# Patient Record
Sex: MALE | Race: WHITE | NOT HISPANIC OR LATINO
[De-identification: names, ages, dates, MRNs, and addresses within clinical notes are randomized per-mention and may not be internally consistent; named-entity substitution may affect disease eponyms.]

---

## 2018-06-26 ENCOUNTER — APPOINTMENT (OUTPATIENT)
Dept: CARDIOLOGY | Facility: CLINIC | Age: 83
End: 2018-06-26
Payer: COMMERCIAL

## 2018-06-26 ENCOUNTER — NON-APPOINTMENT (OUTPATIENT)
Age: 83
End: 2018-06-26

## 2018-06-26 VITALS
HEART RATE: 62 BPM | HEIGHT: 67 IN | WEIGHT: 172 LBS | DIASTOLIC BLOOD PRESSURE: 53 MMHG | SYSTOLIC BLOOD PRESSURE: 105 MMHG | OXYGEN SATURATION: 97 % | BODY MASS INDEX: 27 KG/M2

## 2018-06-26 PROCEDURE — 99215 OFFICE O/P EST HI 40 MIN: CPT | Mod: 25

## 2018-06-26 PROCEDURE — 93000 ELECTROCARDIOGRAM COMPLETE: CPT

## 2018-06-26 RX ORDER — MUPIROCIN 20 MG/G
2 OINTMENT TOPICAL
Qty: 22 | Refills: 0 | Status: DISCONTINUED | COMMUNITY
Start: 2018-04-23 | End: 2018-06-26

## 2018-06-26 RX ORDER — RANITIDINE 150 MG/1
150 TABLET ORAL
Qty: 30 | Refills: 0 | Status: DISCONTINUED | COMMUNITY
Start: 2018-04-11 | End: 2018-06-26

## 2018-06-26 RX ORDER — FLUOROMETHOLONE 1 MG/ML
0.1 SOLUTION/ DROPS OPHTHALMIC
Qty: 5 | Refills: 0 | Status: DISCONTINUED | COMMUNITY
Start: 2018-04-19 | End: 2018-06-26

## 2018-06-26 RX ORDER — TADALAFIL 5 MG/1
5 TABLET, FILM COATED ORAL
Qty: 30 | Refills: 0 | Status: DISCONTINUED | COMMUNITY
Start: 2018-03-12 | End: 2018-06-26

## 2018-06-26 RX ORDER — CIPROFLOXACIN 3 MG/ML
0.3 SOLUTION OPHTHALMIC
Qty: 5 | Refills: 0 | Status: DISCONTINUED | COMMUNITY
Start: 2018-04-19 | End: 2018-06-26

## 2018-06-26 RX ORDER — AMOXICILLIN AND CLAVULANATE POTASSIUM 875; 125 MG/1; MG/1
875-125 TABLET, COATED ORAL
Qty: 14 | Refills: 0 | Status: DISCONTINUED | COMMUNITY
Start: 2018-06-05 | End: 2018-06-26

## 2018-07-18 ENCOUNTER — APPOINTMENT (OUTPATIENT)
Dept: CARDIOLOGY | Facility: CLINIC | Age: 83
End: 2018-07-18
Payer: COMMERCIAL

## 2018-07-18 PROCEDURE — 93306 TTE W/DOPPLER COMPLETE: CPT

## 2018-07-24 ENCOUNTER — APPOINTMENT (OUTPATIENT)
Dept: CARDIOLOGY | Facility: CLINIC | Age: 83
End: 2018-07-24
Payer: COMMERCIAL

## 2018-07-24 PROCEDURE — A9500: CPT

## 2018-07-24 PROCEDURE — 78452 HT MUSCLE IMAGE SPECT MULT: CPT

## 2018-07-24 PROCEDURE — 93015 CV STRESS TEST SUPVJ I&R: CPT

## 2018-08-14 ENCOUNTER — APPOINTMENT (OUTPATIENT)
Dept: CARDIOLOGY | Facility: CLINIC | Age: 83
End: 2018-08-14
Payer: COMMERCIAL

## 2018-08-14 VITALS
HEIGHT: 67 IN | BODY MASS INDEX: 27 KG/M2 | SYSTOLIC BLOOD PRESSURE: 118 MMHG | WEIGHT: 172 LBS | HEART RATE: 72 BPM | DIASTOLIC BLOOD PRESSURE: 72 MMHG | OXYGEN SATURATION: 96 %

## 2018-08-14 PROCEDURE — 99214 OFFICE O/P EST MOD 30 MIN: CPT

## 2019-07-26 ENCOUNTER — APPOINTMENT (OUTPATIENT)
Dept: CARDIOLOGY | Facility: CLINIC | Age: 84
End: 2019-07-26
Payer: COMMERCIAL

## 2019-07-26 VITALS
RESPIRATION RATE: 14 BRPM | HEIGHT: 67 IN | OXYGEN SATURATION: 96 % | SYSTOLIC BLOOD PRESSURE: 132 MMHG | BODY MASS INDEX: 27.94 KG/M2 | WEIGHT: 178 LBS | DIASTOLIC BLOOD PRESSURE: 77 MMHG | HEART RATE: 67 BPM | TEMPERATURE: 98.3 F

## 2019-07-26 PROCEDURE — 99214 OFFICE O/P EST MOD 30 MIN: CPT | Mod: 25

## 2019-07-26 PROCEDURE — 93000 ELECTROCARDIOGRAM COMPLETE: CPT

## 2019-07-26 NOTE — PHYSICAL EXAM
[General Appearance - Well Developed] : well developed [Normal Appearance] : normal appearance [Well Groomed] : well groomed [General Appearance - Well Nourished] : well nourished [No Deformities] : no deformities [General Appearance - In No Acute Distress] : no acute distress [Normal Conjunctiva] : the conjunctiva exhibited no abnormalities [Eyelids - No Xanthelasma] : the eyelids demonstrated no xanthelasmas [Normal Oral Mucosa] : normal oral mucosa [No Oral Pallor] : no oral pallor [No Oral Cyanosis] : no oral cyanosis [Normal Jugular Venous A Waves Present] : normal jugular venous A waves present [Normal Jugular Venous V Waves Present] : normal jugular venous V waves present [No Jugular Venous Hope A Waves] : no jugular venous hope A waves [Respiration, Rhythm And Depth] : normal respiratory rhythm and effort [Exaggerated Use Of Accessory Muscles For Inspiration] : no accessory muscle use [Auscultation Breath Sounds / Voice Sounds] : lungs were clear to auscultation bilaterally [Heart Rate And Rhythm] : heart rate and rhythm were normal [Heart Sounds] : normal S1 and S2 [Murmurs] : no murmurs present [Abdomen Soft] : soft [Abdomen Tenderness] : non-tender [Abdomen Mass (___ Cm)] : no abdominal mass palpated [Abnormal Walk] : normal gait [Gait - Sufficient For Exercise Testing] : the gait was sufficient for exercise testing [Nail Clubbing] : no clubbing of the fingernails [Cyanosis, Localized] : no localized cyanosis [Petechial Hemorrhages (___cm)] : no petechial hemorrhages [Skin Color & Pigmentation] : normal skin color and pigmentation [] : no rash [No Venous Stasis] : no venous stasis [Skin Lesions] : no skin lesions [No Skin Ulcers] : no skin ulcer [No Xanthoma] : no  xanthoma was observed [Oriented To Time, Place, And Person] : oriented to person, place, and time [Affect] : the affect was normal [Mood] : the mood was normal [No Anxiety] : not feeling anxious

## 2019-07-26 NOTE — REASON FOR VISIT
[Follow-Up - Clinic] : a clinic follow-up of [Abnormal ECG] : an abnormal ECG [Dyspnea] : dyspnea [Hypertension] : hypertension [FreeTextEntry1] : chest pain has resolved\par nuclear stress test was wnl

## 2019-07-26 NOTE — DISCUSSION/SUMMARY
[Hypertension] : hypertension [Responding to Treatment] : responding to treatment [Not Responding to Treatment] : not responding to treatment [None] : none [Patient] : the patient [FreeTextEntry1] : Pt is hemodynamically stable. Echo will be ordered.

## 2019-08-01 ENCOUNTER — APPOINTMENT (OUTPATIENT)
Dept: CARDIOLOGY | Facility: CLINIC | Age: 84
End: 2019-08-01
Payer: COMMERCIAL

## 2019-08-01 PROCEDURE — 93306 TTE W/DOPPLER COMPLETE: CPT

## 2019-12-06 ENCOUNTER — NON-APPOINTMENT (OUTPATIENT)
Age: 84
End: 2019-12-06

## 2019-12-06 ENCOUNTER — APPOINTMENT (OUTPATIENT)
Dept: CARDIOLOGY | Facility: CLINIC | Age: 84
End: 2019-12-06
Payer: COMMERCIAL

## 2019-12-06 VITALS
HEART RATE: 91 BPM | SYSTOLIC BLOOD PRESSURE: 116 MMHG | WEIGHT: 167 LBS | OXYGEN SATURATION: 96 % | BODY MASS INDEX: 26.21 KG/M2 | DIASTOLIC BLOOD PRESSURE: 78 MMHG | HEIGHT: 67 IN

## 2019-12-06 PROCEDURE — 99214 OFFICE O/P EST MOD 30 MIN: CPT | Mod: 25

## 2019-12-06 PROCEDURE — 93000 ELECTROCARDIOGRAM COMPLETE: CPT

## 2019-12-06 RX ORDER — FLUTICASONE PROPIONATE 50 UG/1
50 SPRAY, METERED NASAL
Qty: 16 | Refills: 0 | Status: DISCONTINUED | COMMUNITY
Start: 2017-04-27 | End: 2019-12-06

## 2019-12-06 RX ORDER — AZELASTINE HYDROCHLORIDE 137 UG/1
0.1 SPRAY, METERED NASAL
Qty: 30 | Refills: 0 | Status: DISCONTINUED | COMMUNITY
Start: 2018-04-09 | End: 2019-12-06

## 2019-12-06 NOTE — HISTORY OF PRESENT ILLNESS
[FreeTextEntry1] : 83 Y/O gentleman PMH: HTN, HLD, Hypothyroid who presents today with CC of fatigue, positional Dizziness, occasional palpitations.   Denies CP/Syncope

## 2019-12-06 NOTE — REVIEW OF SYSTEMS
[Dizziness] : dizziness [Negative] : Heme/Lymph [Numbness (Hypesthesia)] : no numbness [Tremor] : no tremor was seen [Tingling (Paresthesia)] : no tingling [Convulsions] : no convulsions

## 2019-12-06 NOTE — PHYSICAL EXAM
[General Appearance - Well Developed] : well developed [Normal Appearance] : normal appearance [Well Groomed] : well groomed [General Appearance - Well Nourished] : well nourished [No Deformities] : no deformities [General Appearance - In No Acute Distress] : no acute distress [Normal Conjunctiva] : the conjunctiva exhibited no abnormalities [] : no respiratory distress [Exaggerated Use Of Accessory Muscles For Inspiration] : no accessory muscle use [Respiration, Rhythm And Depth] : normal respiratory rhythm and effort [Heart Rate And Rhythm] : heart rate and rhythm were normal [Auscultation Breath Sounds / Voice Sounds] : lungs were clear to auscultation bilaterally [Abdomen Soft] : soft [Heart Sounds] : normal S1 and S2 [Skin Color & Pigmentation] : normal skin color and pigmentation [Abnormal Walk] : normal gait [Oriented To Time, Place, And Person] : oriented to person, place, and time [FreeTextEntry1] : No LE Edema

## 2019-12-06 NOTE — REASON FOR VISIT
[Follow-Up - Clinic] : a clinic follow-up of [Medication Management] : Medication management [Hypertension] : hypertension [FreeTextEntry1] : dizziness

## 2019-12-06 NOTE — DISCUSSION/SUMMARY
[FreeTextEntry1] : Orthostatic dizziness:  Will suspend avapro, carotid US/labs ordered, advised increase PO fluids, sleep study.  OV one week BP check.  If symptoms unresolved Neuro consult, Holter, stress ( prior nuclear negative for ischemia )

## 2019-12-19 ENCOUNTER — APPOINTMENT (OUTPATIENT)
Dept: CARDIOLOGY | Facility: CLINIC | Age: 84
End: 2019-12-19
Payer: COMMERCIAL

## 2019-12-19 VITALS
OXYGEN SATURATION: 97 % | SYSTOLIC BLOOD PRESSURE: 135 MMHG | WEIGHT: 167 LBS | DIASTOLIC BLOOD PRESSURE: 90 MMHG | HEIGHT: 67 IN | BODY MASS INDEX: 26.21 KG/M2 | HEART RATE: 76 BPM

## 2019-12-19 DIAGNOSIS — R42 DIZZINESS AND GIDDINESS: ICD-10-CM

## 2019-12-19 PROCEDURE — 99214 OFFICE O/P EST MOD 30 MIN: CPT

## 2019-12-19 RX ORDER — SODIUM SULFATE, POTASSIUM SULFATE, MAGNESIUM SULFATE 17.5; 3.13; 1.6 G/ML; G/ML; G/ML
17.5-3.13-1.6 SOLUTION, CONCENTRATE ORAL
Qty: 354 | Refills: 0 | Status: DISCONTINUED | COMMUNITY
Start: 2019-09-24 | End: 2019-12-19

## 2019-12-19 RX ORDER — POLYETHYLENE GLYCOL 3350, SODIUM SULFATE, SODIUM CHLORIDE, POTASSIUM CHLORIDE, ASCORBIC ACID, SODIUM ASCORBATE 7.5-2.691G
100 KIT ORAL
Qty: 1 | Refills: 0 | Status: DISCONTINUED | COMMUNITY
Start: 2019-08-01 | End: 2019-12-19

## 2019-12-19 RX ORDER — SILDENAFIL 50 MG/1
50 TABLET ORAL
Qty: 6 | Refills: 0 | Status: DISCONTINUED | COMMUNITY
Start: 2019-10-24 | End: 2019-12-19

## 2019-12-19 RX ORDER — FINASTERIDE 5 MG/1
5 TABLET, FILM COATED ORAL
Qty: 30 | Refills: 0 | Status: DISCONTINUED | COMMUNITY
Start: 2018-07-03 | End: 2019-12-19

## 2019-12-20 ENCOUNTER — APPOINTMENT (OUTPATIENT)
Dept: CARDIOLOGY | Facility: CLINIC | Age: 84
End: 2019-12-20
Payer: COMMERCIAL

## 2019-12-20 PROCEDURE — 93880 EXTRACRANIAL BILAT STUDY: CPT

## 2019-12-20 NOTE — HISTORY OF PRESENT ILLNESS
[FreeTextEntry1] : 83 Y/O gentleman PMH: HTN, HLD, Hypothyroid who presents today for BP check after suspension of Avapro 2/2 dizziness, symptoms did not improve he is now back on 1/2 dose of Avapro 75 MG.  Denies CP/Syncope\par \par Refuses EKG today\par pressure controlled ( no orthostasis )

## 2019-12-20 NOTE — DISCUSSION/SUMMARY
[FreeTextEntry1] : Fatigue/Dizziness: Symptoms Unchanged with recent medication adjustment, Pressure normotensive continue Avapro, MRI Brain ( Per PCP ) recent labs from PCP reviewed, Had non ischemic stress test 7/2018, Echo 8/2019 reviewed.  \par \par Plan: Advised sleep study, carotid US, telemetry ( declines telemetry ) will return for stress test\par will continue W/U with PCP ( suggested neuro consult However, patient declines )

## 2019-12-20 NOTE — REVIEW OF SYSTEMS
[Dizziness] : dizziness [Negative] : Endocrine [Tremor] : no tremor was seen [Tingling (Paresthesia)] : no tingling [Convulsions] : no convulsions [Numbness (Hypesthesia)] : no numbness

## 2019-12-20 NOTE — PHYSICAL EXAM
[Normal Appearance] : normal appearance [General Appearance - Well Developed] : well developed [Well Groomed] : well groomed [General Appearance - Well Nourished] : well nourished [No Deformities] : no deformities [General Appearance - In No Acute Distress] : no acute distress [Normal Conjunctiva] : the conjunctiva exhibited no abnormalities [] : no respiratory distress [Respiration, Rhythm And Depth] : normal respiratory rhythm and effort [Exaggerated Use Of Accessory Muscles For Inspiration] : no accessory muscle use [Auscultation Breath Sounds / Voice Sounds] : lungs were clear to auscultation bilaterally [Heart Sounds] : normal S1 and S2 [Abdomen Soft] : soft [Heart Rate And Rhythm] : heart rate and rhythm were normal [Abnormal Walk] : normal gait [Oriented To Time, Place, And Person] : oriented to person, place, and time [Skin Color & Pigmentation] : normal skin color and pigmentation [FreeTextEntry1] : No LE Edema

## 2019-12-20 NOTE — REASON FOR VISIT
[Hypertension] : hypertension [Follow-Up - Clinic] : a clinic follow-up of [Medication Management] : Medication management [FreeTextEntry1] : dizziness

## 2020-08-03 ENCOUNTER — NON-APPOINTMENT (OUTPATIENT)
Age: 85
End: 2020-08-03

## 2020-08-03 ENCOUNTER — APPOINTMENT (OUTPATIENT)
Dept: CARDIOLOGY | Facility: CLINIC | Age: 85
End: 2020-08-03
Payer: COMMERCIAL

## 2020-08-03 VITALS
DIASTOLIC BLOOD PRESSURE: 78 MMHG | HEIGHT: 67 IN | WEIGHT: 165 LBS | SYSTOLIC BLOOD PRESSURE: 132 MMHG | BODY MASS INDEX: 25.9 KG/M2

## 2020-08-03 PROCEDURE — 99214 OFFICE O/P EST MOD 30 MIN: CPT

## 2020-08-03 PROCEDURE — 93306 TTE W/DOPPLER COMPLETE: CPT

## 2020-08-03 PROCEDURE — 93000 ELECTROCARDIOGRAM COMPLETE: CPT

## 2020-08-03 NOTE — HISTORY OF PRESENT ILLNESS
[FreeTextEntry1] : here today with CC of resting mid sternal chest discomfort lasting 20 seconds remitted spontaneously W/O associated SOB/Palpitations/dizziness/syncope or radiation of symptoms, has not reoccurred and is currently asymptomatic.  He is otherwise W/O complaints

## 2020-08-03 NOTE — DISCUSSION/SUMMARY
[FreeTextEntry1] : Chest discomfort  non exertional remitted after 20 seconds.  Has abnormal EKG will obtain Echo/stress Echo and labs ordered \par \par OV after testing

## 2020-08-03 NOTE — PHYSICAL EXAM
[Normal Appearance] : normal appearance [General Appearance - Well Developed] : well developed [Well Groomed] : well groomed [General Appearance - Well Nourished] : well nourished [No Deformities] : no deformities [General Appearance - In No Acute Distress] : no acute distress [] : no respiratory distress [Normal Conjunctiva] : the conjunctiva exhibited no abnormalities [Auscultation Breath Sounds / Voice Sounds] : lungs were clear to auscultation bilaterally [Exaggerated Use Of Accessory Muscles For Inspiration] : no accessory muscle use [Respiration, Rhythm And Depth] : normal respiratory rhythm and effort [Heart Rate And Rhythm] : heart rate and rhythm were normal [Heart Sounds] : normal S1 and S2 [Abdomen Soft] : soft [Abnormal Walk] : normal gait [Oriented To Time, Place, And Person] : oriented to person, place, and time [Skin Color & Pigmentation] : normal skin color and pigmentation [FreeTextEntry1] : NO LE Edema

## 2020-08-14 DIAGNOSIS — E55.9 VITAMIN D DEFICIENCY, UNSPECIFIED: ICD-10-CM

## 2021-09-23 ENCOUNTER — APPOINTMENT (OUTPATIENT)
Dept: CARDIOLOGY | Facility: CLINIC | Age: 86
End: 2021-09-23
Payer: MEDICARE

## 2021-09-23 ENCOUNTER — NON-APPOINTMENT (OUTPATIENT)
Age: 86
End: 2021-09-23

## 2021-09-23 VITALS
OXYGEN SATURATION: 96 % | BODY MASS INDEX: 27.47 KG/M2 | DIASTOLIC BLOOD PRESSURE: 84 MMHG | HEART RATE: 56 BPM | SYSTOLIC BLOOD PRESSURE: 130 MMHG | WEIGHT: 175 LBS | HEIGHT: 67 IN

## 2021-09-23 PROCEDURE — 93306 TTE W/DOPPLER COMPLETE: CPT

## 2021-09-23 PROCEDURE — 93000 ELECTROCARDIOGRAM COMPLETE: CPT

## 2021-09-23 PROCEDURE — 99214 OFFICE O/P EST MOD 30 MIN: CPT

## 2021-09-23 RX ORDER — SERTRALINE HYDROCHLORIDE 100 MG/1
100 TABLET, FILM COATED ORAL
Qty: 30 | Refills: 0 | Status: DISCONTINUED | COMMUNITY
Start: 2018-04-09 | End: 2021-09-23

## 2021-10-01 NOTE — HISTORY OF PRESENT ILLNESS
[FreeTextEntry1] : 87 Y/O gentleman PMH: HTN, HLD, Hypothyroid, bradycardia who presents today in routine cardiac follow up  with CC of  mild intermittent SOB sporadic no triggering factors no associated CP/Palpitations/dizziness seems anxious today \par \par Carotid 2019 No significant disease NL velocities \par Echo 2019 Mildly dilated AO, Mild LVH/AI\par Nuclear stress test 2018 Non ischemic

## 2021-10-01 NOTE — REVIEW OF SYSTEMS
[SOB] : shortness of breath [Negative] : Heme/Lymph [Dyspnea on exertion] : not dyspnea during exertion [Chest Discomfort] : no chest discomfort [Lower Ext Edema] : no extremity edema [Leg Claudication] : no intermittent leg claudication [Palpitations] : no palpitations [Orthopnea] : no orthopnea [PND] : no PND [Syncope] : no syncope

## 2021-10-01 NOTE — DISCUSSION/SUMMARY
[FreeTextEntry1] : SOB: Will obtain Echo, exercise stress test, labs, CXR\par \par Bradycardia: Advised Zio monitor/sleep study and labs but patient refused \par \par HTN: controlled \par \par HLD: Continue statin labs ordered \par \par OV after testing \par

## 2021-10-01 NOTE — PHYSICAL EXAM
[General Appearance - Well Developed] : well developed [Normal Appearance] : normal appearance [Well Groomed] : well groomed [General Appearance - Well Nourished] : well nourished [No Deformities] : no deformities [General Appearance - In No Acute Distress] : no acute distress [] : no respiratory distress [Respiration, Rhythm And Depth] : normal respiratory rhythm and effort [Exaggerated Use Of Accessory Muscles For Inspiration] : no accessory muscle use [Auscultation Breath Sounds / Voice Sounds] : lungs were clear to auscultation bilaterally [Heart Rate And Rhythm] : heart rate and rhythm were normal [Heart Sounds] : normal S1 and S2 [Abdomen Soft] : soft [Abnormal Walk] : normal gait [Skin Color & Pigmentation] : normal skin color and pigmentation [Oriented To Time, Place, And Person] : oriented to person, place, and time [Normal Conjunctiva] : normal conjunctiva [No Carotid Bruit] : no carotid bruit [Normal S1, S2] : normal S1, S2 [Soft] : abdomen soft [Normal Gait] : normal gait [No Edema] : no edema [Normal] : moves all extremities, no focal deficits, normal speech [Alert and Oriented] : alert and oriented [de-identified] : 1/6 YISEL  [FreeTextEntry1] : No JVD

## 2021-10-12 ENCOUNTER — APPOINTMENT (OUTPATIENT)
Dept: GASTROENTEROLOGY | Facility: CLINIC | Age: 86
End: 2021-10-12
Payer: MEDICARE

## 2021-10-12 VITALS
SYSTOLIC BLOOD PRESSURE: 131 MMHG | HEART RATE: 69 BPM | HEIGHT: 67 IN | WEIGHT: 178 LBS | BODY MASS INDEX: 27.94 KG/M2 | DIASTOLIC BLOOD PRESSURE: 79 MMHG

## 2021-10-12 DIAGNOSIS — Z12.11 ENCOUNTER FOR SCREENING FOR MALIGNANT NEOPLASM OF COLON: ICD-10-CM

## 2021-10-12 PROCEDURE — 99203 OFFICE O/P NEW LOW 30 MIN: CPT

## 2021-10-16 PROBLEM — Z12.11 ENCOUNTER FOR SCREENING COLONOSCOPY: Status: RESOLVED | Noted: 2021-10-12 | Resolved: 2021-10-26

## 2021-10-16 NOTE — HISTORY OF PRESENT ILLNESS
[de-identified] : 85yo male for evaluation of altered bowel habits\par \par He had episode of significant constipation and felt blocked up\par He eventually had BM and feels better with no pain and normal 1-2 BM daily since\par He notes last colonoscopy 2 years ago\par He has had large polyp in the past but not recent

## 2021-10-16 NOTE — PHYSICAL EXAM
[General Appearance - Alert] : alert [General Appearance - In No Acute Distress] : in no acute distress [Auscultation Breath Sounds / Voice Sounds] : lungs were clear to auscultation bilaterally [Heart Rate And Rhythm] : heart rate was normal and rhythm regular [Heart Sounds] : normal S1 and S2 [Heart Sounds Gallop] : no gallops [Murmurs] : no murmurs [Heart Sounds Pericardial Friction Rub] : no pericardial rub [Bowel Sounds] : normal bowel sounds [Abdomen Soft] : soft [Abdomen Tenderness] : non-tender [Abdomen Mass (___ Cm)] : no abdominal mass palpated [] : no hepato-splenomegaly [Abnormal Walk] : normal gait [Nail Clubbing] : no clubbing  or cyanosis of the fingernails [Motor Tone] : muscle strength and tone were normal [Musculoskeletal - Swelling] : no joint swelling seen [Oriented To Time, Place, And Person] : oriented to person, place, and time [Impaired Insight] : insight and judgment were intact [Affect] : the affect was normal

## 2022-05-12 ENCOUNTER — NON-APPOINTMENT (OUTPATIENT)
Age: 87
End: 2022-05-12

## 2022-05-12 ENCOUNTER — APPOINTMENT (OUTPATIENT)
Dept: CARDIOLOGY | Facility: CLINIC | Age: 87
End: 2022-05-12
Payer: MEDICARE

## 2022-05-12 VITALS
DIASTOLIC BLOOD PRESSURE: 100 MMHG | SYSTOLIC BLOOD PRESSURE: 146 MMHG | HEIGHT: 67 IN | OXYGEN SATURATION: 95 % | HEART RATE: 88 BPM | WEIGHT: 179 LBS | BODY MASS INDEX: 28.09 KG/M2

## 2022-05-12 DIAGNOSIS — I10 ESSENTIAL (PRIMARY) HYPERTENSION: ICD-10-CM

## 2022-05-12 DIAGNOSIS — E78.5 HYPERLIPIDEMIA, UNSPECIFIED: ICD-10-CM

## 2022-05-12 DIAGNOSIS — R06.02 SHORTNESS OF BREATH: ICD-10-CM

## 2022-05-12 PROCEDURE — 93000 ELECTROCARDIOGRAM COMPLETE: CPT

## 2022-05-12 PROCEDURE — 99214 OFFICE O/P EST MOD 30 MIN: CPT

## 2022-05-12 RX ORDER — RABEPRAZOLE SODIUM 20 MG/1
20 TABLET, DELAYED RELEASE ORAL DAILY
Refills: 0 | Status: ACTIVE | COMMUNITY
Start: 2018-03-12

## 2022-05-12 RX ORDER — IRBESARTAN 150 MG/1
150 TABLET, FILM COATED ORAL DAILY
Refills: 0 | Status: ACTIVE | COMMUNITY

## 2022-05-12 RX ORDER — TAMSULOSIN HYDROCHLORIDE 0.4 MG/1
0.4 CAPSULE ORAL
Qty: 7 | Refills: 0 | Status: ACTIVE | COMMUNITY

## 2022-05-12 NOTE — REVIEW OF SYSTEMS
[SOB] : shortness of breath [Chest Discomfort] : no chest discomfort [Dyspnea on exertion] : not dyspnea during exertion [Lower Ext Edema] : no extremity edema [Leg Claudication] : no intermittent leg claudication [Palpitations] : no palpitations [Orthopnea] : no orthopnea [PND] : no PND [Syncope] : no syncope [Negative] : Heme/Lymph

## 2022-05-12 NOTE — HISTORY OF PRESENT ILLNESS
[FreeTextEntry1] : 85 Y/O gentleman PMH: HTN, HLD, Hypothyroid, bradycardia who presents today in routine cardiac follow up  with CC of  mild intermittent SOB sporadic no triggering factors no associated CP/Palpitations/dizziness seems anxious today. \par \par Carotid 2019 No significant disease NL velocities \par Echo 2019 Mildly dilated AO, Mild LVH/AI\par Nuclear stress test 2018 Non ischemic

## 2022-05-12 NOTE — DISCUSSION/SUMMARY
[FreeTextEntry1] : SOB: Will obtain Pharm/Nuclear Stress\par \par ECG to evaluate for ischemia.\par HTN: controlled \par \par HLD: Continue statin labs ordered \par \par Follow up in 2 weeks or prn

## 2022-05-12 NOTE — REASON FOR VISIT
[Symptom and Test Evaluation] : symptom and test evaluation [Arrhythmia/ECG Abnorrmalities] : arrhythmia/ECG abnormalities [Hyperlipidemia] : hyperlipidemia [Hypertension] : hypertension [Follow-Up - Clinic] : a clinic follow-up of

## 2022-05-12 NOTE — PHYSICAL EXAM
[No Carotid Bruit] : no carotid bruit [Normal S1, S2] : normal S1, S2 [Soft] : abdomen soft [Normal Gait] : normal gait [No Edema] : no edema [Normal] : moves all extremities, no focal deficits, normal speech [Alert and Oriented] : alert and oriented [de-identified] : 1/6 YISEL  [General Appearance - Well Developed] : well developed [Normal Appearance] : normal appearance [Well Groomed] : well groomed [General Appearance - Well Nourished] : well nourished [No Deformities] : no deformities [Normal Conjunctiva] : the conjunctiva exhibited no abnormalities [General Appearance - In No Acute Distress] : no acute distress [] : no respiratory distress [Respiration, Rhythm And Depth] : normal respiratory rhythm and effort [Exaggerated Use Of Accessory Muscles For Inspiration] : no accessory muscle use [Auscultation Breath Sounds / Voice Sounds] : lungs were clear to auscultation bilaterally [Heart Rate And Rhythm] : heart rate and rhythm were normal [Heart Sounds] : normal S1 and S2 [Abdomen Soft] : soft [Abnormal Walk] : normal gait [Skin Color & Pigmentation] : normal skin color and pigmentation [Oriented To Time, Place, And Person] : oriented to person, place, and time [FreeTextEntry1] : No JVD

## 2022-05-16 DIAGNOSIS — R07.9 CHEST PAIN, UNSPECIFIED: ICD-10-CM

## 2022-06-16 ENCOUNTER — APPOINTMENT (OUTPATIENT)
Dept: CT IMAGING | Facility: CLINIC | Age: 87
End: 2022-06-16
Payer: MEDICARE

## 2022-06-16 ENCOUNTER — RESULT REVIEW (OUTPATIENT)
Age: 87
End: 2022-06-16

## 2022-06-16 ENCOUNTER — OUTPATIENT (OUTPATIENT)
Dept: OUTPATIENT SERVICES | Facility: HOSPITAL | Age: 87
LOS: 1 days | End: 2022-06-16
Payer: MEDICARE

## 2022-06-16 DIAGNOSIS — R07.9 CHEST PAIN, UNSPECIFIED: ICD-10-CM

## 2022-06-16 PROCEDURE — 75574 CT ANGIO HRT W/3D IMAGE: CPT | Mod: 26,MH

## 2022-06-16 PROCEDURE — 75574 CT ANGIO HRT W/3D IMAGE: CPT | Mod: MH

## 2022-06-17 DIAGNOSIS — R00.1 BRADYCARDIA, UNSPECIFIED: ICD-10-CM

## 2022-06-20 LAB
ALBUMIN SERPL ELPH-MCNC: 4.3 G/DL
ALP BLD-CCNC: 91 U/L
ALT SERPL-CCNC: 13 U/L
ANION GAP SERPL CALC-SCNC: 9 MMOL/L
APTT BLD: 34.3 SEC
AST SERPL-CCNC: 18 U/L
BASOPHILS # BLD AUTO: 0.03 K/UL
BASOPHILS NFR BLD AUTO: 0.4 %
BILIRUB SERPL-MCNC: 0.4 MG/DL
BUN SERPL-MCNC: 17 MG/DL
CALCIUM SERPL-MCNC: 8.9 MG/DL
CHLORIDE SERPL-SCNC: 105 MMOL/L
CO2 SERPL-SCNC: 25 MMOL/L
CREAT SERPL-MCNC: 0.91 MG/DL
EGFR: 83 ML/MIN/1.73M2
EOSINOPHIL # BLD AUTO: 0.27 K/UL
EOSINOPHIL NFR BLD AUTO: 3.4 %
GLUCOSE SERPL-MCNC: 106 MG/DL
HCT VFR BLD CALC: 40.7 %
HGB BLD-MCNC: 13.6 G/DL
IMM GRANULOCYTES NFR BLD AUTO: 0.4 %
INR PPP: 1.02 RATIO
LYMPHOCYTES # BLD AUTO: 1.33 K/UL
LYMPHOCYTES NFR BLD AUTO: 16.9 %
MAGNESIUM SERPL-MCNC: 1.8 MG/DL
MAN DIFF?: NORMAL
MCHC RBC-ENTMCNC: 30.2 PG
MCHC RBC-ENTMCNC: 33.4 GM/DL
MCV RBC AUTO: 90.2 FL
MONOCYTES # BLD AUTO: 0.62 K/UL
MONOCYTES NFR BLD AUTO: 7.9 %
NEUTROPHILS # BLD AUTO: 5.58 K/UL
NEUTROPHILS NFR BLD AUTO: 71 %
PLATELET # BLD AUTO: 257 K/UL
POTASSIUM SERPL-SCNC: 4.2 MMOL/L
PROT SERPL-MCNC: 6.6 G/DL
PT BLD: 11.9 SEC
RBC # BLD: 4.51 M/UL
RBC # FLD: 12.7 %
SODIUM SERPL-SCNC: 140 MMOL/L
WBC # FLD AUTO: 7.86 K/UL

## 2022-06-21 LAB — SARS-COV-2 N GENE NPH QL NAA+PROBE: NOT DETECTED

## 2022-06-22 NOTE — H&P ADULT - NSICDXFAMILYHX_GEN_ALL_CORE_FT
FAMILY HISTORY:  Mother  Still living? No  Family history of cerebrovascular accident (CVA) in mother, Age at diagnosis: Age Unknown

## 2022-06-22 NOTE — H&P ADULT - NSHPREVIEWOFSYSTEMS_GEN_ALL_CORE
REVIEW OF SYSTEMS:    CONSTITUTIONAL: No weakness, fevers or chills  EYES/ENT: No visual changes;  No vertigo or throat pain   NECK: No pain or stiffness  RESPIRATORY: No cough, wheezing, hemoptysis; +shortness of breath  CARDIOVASCULAR: +intermittent chest pain not triggered by exertion. Denies palpitations  GASTROINTESTINAL: No abdominal or epigastric pain. No nausea, vomiting, or hematemesis; No diarrhea or constipation. No melena or hematochezia.  GENITOURINARY: No dysuria, frequency or hematuria  NEUROLOGICAL: No numbness or weakness  SKIN: No itching, burning, rashes, or lesions   All other review of systems is negative unless indicated above.

## 2022-06-22 NOTE — H&P ADULT - NSHPPHYSICALEXAM_GEN_ALL_CORE
PHYSICAL EXAM:    Vital Signs Last 24 Hrs  T(C): 36.6 (23 Jun 2022 06:55), Max: 36.6 (23 Jun 2022 06:55)  T(F): 97.9 (23 Jun 2022 06:55), Max: 97.9 (23 Jun 2022 06:55)  HR: 50 (23 Jun 2022 07:23) (50 - 53)  BP: 158/74 (23 Jun 2022 07:23) (158/74 - 183/83)  RR: 16 (23 Jun 2022 06:55) (16 - 16)  SpO2: 96% (23 Jun 2022 06:55) (96% - 96%)    Constitutional: NAD, well-groomed, well-developed  Neuro: Alert and oriented x 3 Gait steady Speech clear No focal deficits  Neck: No JVD No bruit  Respiratory: CTAB  Cardiovascular: S1 and S2, RRR,   Gastrointestinal: BS+, soft, NT/ND  Extremities: No clubbing cyanosis or edema No varicosities  Vascular: 2+ peripheral pulses  Psychiatric: Normal mood, normal affect  Musculoskeletal: 5/5 strength b/l upper and lower extremities

## 2022-06-22 NOTE — H&P ADULT - PROBLEM SELECTOR PLAN 1
a/w SOB , calcium score 3627  -plan for cardiac cath for ischemic work up  - IVF  cc bolus   -Consent obtained for cardiac catheterization w/ coronary angiogram and possible stent placement. Pt is competent, has capacity, and understands risks and benefits of procedure. Risks and benefits discussed. Risk discussed included, but not limited to MI, stroke, mortality, major bleeding, arrythmia, or infection. All questions answered

## 2022-06-22 NOTE — H&P ADULT - NSHPLABSRESULTS_GEN_ALL_CORE
6/16/22 CT coronaries - calcium score for 3627, and suggestive of severe LAD stenosis , LCX and RCA moderate disease    6/23/22 EKG 6/16/22 CT coronaries - calcium score for 3627, and suggestive of severe LAD stenosis , LCX and RCA moderate disease    6/23/22 EKG: SB

## 2022-06-22 NOTE — CHART NOTE - NSCHARTNOTEFT_GEN_A_CORE
Preop Phone Call: [phone]  - Able to Reach Patient:  yes  - Info given to:	patient having cardiac catheterization  -  and allergies confirmed: yes  - Medication Information Given: yes  - Medications To Take (specify)	Ok to take a.m. meds w/sip of water  - Medications To Hold (Specify)	none  - Arrival Time: 0630  - NPO after:	0000  - Understanding of Information Verbalized: yes  will have a  over the age of 18  for d/c home  - Understanding of possible overnight stay if stent is placed: yes

## 2022-06-22 NOTE — H&P ADULT - ASSESSMENT
85 y/o male with PMHx of HTN, HLD, hypothyroidism bradycardia presented to cardiology with c/o intermittent SOB. Cardiac work up completed. CT coronaries done with calcium score for 3627, and suggestive of severe LAD stenosis     ASA class:  Creatinine:  GFR:  Bleeding  Risk score:  Ron Score:  87 y/o male with PMHx of HTN, HLD, hypothyroidism bradycardia presented to cardiology with c/o intermittent SOB. Cardiac work up completed. CT coronaries done with calcium score for 3627, and suggestive of severe LAD stenosis     ASA class:II  Creatinine:0.91  GFR:84  Bleeding  Risk score:1.2%  Ron Score: 5

## 2022-06-22 NOTE — H&P ADULT - HISTORY OF PRESENT ILLNESS
85 y/o male with PMHx of HTN, HLD, hypothyroidism bradycardia presented to cardiology with c/o intermittent SOB. Cardiac work up completed. CT coronaries done with calcium score for 3627, and suggestive of severe LAD stenosis . Referred for cardiac cath to further evaluate  85 y/o male with PMHx of HTN, HLD, hypothyroidism bradycardia presented to cardiology with c/o intermittent SOB and chest discomfort not triggered by pain. Cardiac work up completed. CT coronaries done with calcium score for 3627, and suggestive of severe LAD stenosis . Referred for cardiac cath to further evaluate  85 y/o male with PMHx of HTN, HLD, hypothyroidism bradycardia presented to cardiology with c/o intermittent SOB and chest discomfort not triggered by activities. Cardiac work up completed. CT coronaries done with calcium score for 3627, and suggestive of severe LAD stenosis . Referred for cardiac cath to further evaluate

## 2022-06-23 ENCOUNTER — OUTPATIENT (OUTPATIENT)
Dept: OUTPATIENT SERVICES | Facility: HOSPITAL | Age: 87
LOS: 1 days | Discharge: HOME | End: 2022-06-23
Payer: MEDICARE

## 2022-06-23 VITALS
RESPIRATION RATE: 16 BRPM | SYSTOLIC BLOOD PRESSURE: 183 MMHG | DIASTOLIC BLOOD PRESSURE: 83 MMHG | WEIGHT: 173.94 LBS | TEMPERATURE: 98 F | HEART RATE: 53 BPM | OXYGEN SATURATION: 96 % | HEIGHT: 67 IN

## 2022-06-23 DIAGNOSIS — R06.09 OTHER FORMS OF DYSPNEA: ICD-10-CM

## 2022-06-23 DIAGNOSIS — Z98.890 OTHER SPECIFIED POSTPROCEDURAL STATES: Chronic | ICD-10-CM

## 2022-06-23 DIAGNOSIS — I25.10 ATHEROSCLEROTIC HEART DISEASE OF NATIVE CORONARY ARTERY WITHOUT ANGINA PECTORIS: ICD-10-CM

## 2022-06-23 PROCEDURE — 93010 ELECTROCARDIOGRAM REPORT: CPT | Mod: 76

## 2022-06-23 PROCEDURE — C1894: CPT

## 2022-06-23 PROCEDURE — 93458 L HRT ARTERY/VENTRICLE ANGIO: CPT | Mod: 26

## 2022-06-23 PROCEDURE — 93005 ELECTROCARDIOGRAM TRACING: CPT | Mod: XU

## 2022-06-23 PROCEDURE — 85027 COMPLETE CBC AUTOMATED: CPT

## 2022-06-23 PROCEDURE — 99153 MOD SED SAME PHYS/QHP EA: CPT

## 2022-06-23 PROCEDURE — C1769: CPT

## 2022-06-23 PROCEDURE — 80048 BASIC METABOLIC PNL TOTAL CA: CPT

## 2022-06-23 PROCEDURE — 99152 MOD SED SAME PHYS/QHP 5/>YRS: CPT

## 2022-06-23 PROCEDURE — 93458 L HRT ARTERY/VENTRICLE ANGIO: CPT

## 2022-06-23 PROCEDURE — 36415 COLL VENOUS BLD VENIPUNCTURE: CPT

## 2022-06-23 PROCEDURE — C1887: CPT

## 2022-06-23 RX ORDER — HYDRALAZINE HCL 50 MG
10 TABLET ORAL ONCE
Refills: 0 | Status: DISCONTINUED | OUTPATIENT
Start: 2022-06-23 | End: 2022-06-24

## 2022-06-23 RX ORDER — ACETAMINOPHEN 500 MG
1000 TABLET ORAL ONCE
Refills: 0 | Status: COMPLETED | OUTPATIENT
Start: 2022-06-23 | End: 2022-06-23

## 2022-06-23 RX ORDER — TAMSULOSIN HYDROCHLORIDE 0.4 MG/1
1 CAPSULE ORAL
Qty: 0 | Refills: 0 | DISCHARGE

## 2022-06-23 RX ORDER — ATORVASTATIN CALCIUM 80 MG/1
1 TABLET, FILM COATED ORAL
Qty: 0 | Refills: 0 | DISCHARGE

## 2022-06-23 RX ORDER — TAMSULOSIN HYDROCHLORIDE 0.4 MG/1
0.4 CAPSULE ORAL ONCE
Refills: 0 | Status: COMPLETED | OUTPATIENT
Start: 2022-06-23 | End: 2022-06-23

## 2022-06-23 RX ORDER — LEVOTHYROXINE SODIUM 125 MCG
1 TABLET ORAL
Qty: 0 | Refills: 0 | DISCHARGE

## 2022-06-23 RX ORDER — ATORVASTATIN CALCIUM 80 MG/1
10 TABLET, FILM COATED ORAL AT BEDTIME
Refills: 0 | Status: DISCONTINUED | OUTPATIENT
Start: 2022-06-23 | End: 2022-06-24

## 2022-06-23 RX ORDER — SODIUM CHLORIDE 9 MG/ML
1000 INJECTION INTRAMUSCULAR; INTRAVENOUS; SUBCUTANEOUS ONCE
Refills: 0 | Status: DISCONTINUED | OUTPATIENT
Start: 2022-06-23 | End: 2022-06-24

## 2022-06-23 RX ORDER — TAMSULOSIN HYDROCHLORIDE 0.4 MG/1
0.4 CAPSULE ORAL AT BEDTIME
Refills: 0 | Status: DISCONTINUED | OUTPATIENT
Start: 2022-06-24 | End: 2022-06-24

## 2022-06-23 RX ORDER — ACETAMINOPHEN 500 MG
650 TABLET ORAL EVERY 6 HOURS
Refills: 0 | Status: DISCONTINUED | OUTPATIENT
Start: 2022-06-23 | End: 2022-06-24

## 2022-06-23 RX ORDER — SODIUM CHLORIDE 9 MG/ML
1000 INJECTION INTRAMUSCULAR; INTRAVENOUS; SUBCUTANEOUS
Refills: 0 | Status: DISCONTINUED | OUTPATIENT
Start: 2022-06-23 | End: 2022-06-24

## 2022-06-23 RX ORDER — IRBESARTAN 75 MG/1
1 TABLET ORAL
Qty: 0 | Refills: 0 | DISCHARGE

## 2022-06-23 RX ORDER — RABEPRAZOLE 20 MG/1
20 TABLET, DELAYED RELEASE ORAL
Qty: 0 | Refills: 0 | DISCHARGE

## 2022-06-23 RX ORDER — ASPIRIN/CALCIUM CARB/MAGNESIUM 324 MG
162 TABLET ORAL
Qty: 0 | Refills: 0 | DISCHARGE

## 2022-06-23 RX ORDER — ASPIRIN/CALCIUM CARB/MAGNESIUM 324 MG
162 TABLET ORAL DAILY
Refills: 0 | Status: DISCONTINUED | OUTPATIENT
Start: 2022-06-24 | End: 2022-06-24

## 2022-06-23 RX ORDER — LOSARTAN POTASSIUM 100 MG/1
50 TABLET, FILM COATED ORAL DAILY
Refills: 0 | Status: DISCONTINUED | OUTPATIENT
Start: 2022-06-24 | End: 2022-06-24

## 2022-06-23 RX ORDER — ONDANSETRON 8 MG/1
4 TABLET, FILM COATED ORAL EVERY 8 HOURS
Refills: 0 | Status: DISCONTINUED | OUTPATIENT
Start: 2022-06-23 | End: 2022-06-24

## 2022-06-23 RX ORDER — HYDRALAZINE HCL 50 MG
10 TABLET ORAL ONCE
Refills: 0 | Status: COMPLETED | OUTPATIENT
Start: 2022-06-23 | End: 2022-06-23

## 2022-06-23 RX ORDER — LEVOTHYROXINE SODIUM 125 MCG
125 TABLET ORAL DAILY
Refills: 0 | Status: DISCONTINUED | OUTPATIENT
Start: 2022-06-24 | End: 2022-06-24

## 2022-06-23 RX ADMIN — TAMSULOSIN HYDROCHLORIDE 0.4 MILLIGRAM(S): 0.4 CAPSULE ORAL at 14:05

## 2022-06-23 RX ADMIN — Medication 10 MILLIGRAM(S): at 10:19

## 2022-06-23 RX ADMIN — ATORVASTATIN CALCIUM 10 MILLIGRAM(S): 80 TABLET, FILM COATED ORAL at 21:55

## 2022-06-23 RX ADMIN — Medication 400 MILLIGRAM(S): at 21:55

## 2022-06-23 RX ADMIN — Medication 650 MILLIGRAM(S): at 17:24

## 2022-06-23 RX ADMIN — Medication 1000 MILLIGRAM(S): at 23:00

## 2022-06-23 RX ADMIN — ONDANSETRON 4 MILLIGRAM(S): 8 TABLET, FILM COATED ORAL at 20:40

## 2022-06-23 RX ADMIN — SODIUM CHLORIDE 250 MILLILITER(S): 9 INJECTION INTRAMUSCULAR; INTRAVENOUS; SUBCUTANEOUS at 07:19

## 2022-06-23 NOTE — CHART NOTE - NSCHARTNOTEFT_GEN_A_CORE
CC: Called by RN patient c/o "migraine" a/w vomiting.  HPI:  87 y/o male with PMHx of HTN, HLD, hypothyroidism bradycardia presented to cardiology with c/o intermittent SOB and chest discomfort not triggered by pain. Cardiac work up completed. CT coronaries done with calcium score for 3627, and suggestive of severe LAD stenosis .     s/p LHC revealing calcified vessels and tortuosity of vessels.  patient placed in observation 2/2 inability void post sedation and glaser placed at 5pm.     Patient seen in CICU, comfortable c/o "typical migraine" with nausea and sit up once. Patient states he usually takes [place] at home with relief of symptoms     Vital Signs Last 24 Hrs  T(C): 36.8 (23 Jun 2022 17:00), Max: 36.8 (23 Jun 2022 17:00)  T(F): 98.2 (23 Jun 2022 17:00), Max: 98.2 (23 Jun 2022 17:00)  HR: 81 (23 Jun 2022 20:09) (42 - 82)  BP: 133/82 (23 Jun 2022 20:09) (81/48 - 183/83)  BP(mean): 93 (23 Jun 2022 20:09) (93 - 102)  RR: 22 (23 Jun 2022 20:09) (14 - 23)  SpO2: 98% (23 Jun 2022 17:00) (96% - 99%)      PHYSICAL EXAM  GENERAL: NAD, AAOx3  CHEST/LUNG: Clear to auscultation bilaterally; No wheeze  HEART: s1 s2 Regular rate and rhythm; No murmurs, rubs, or gallops  ABDOMEN: Soft, Nontender, Nondistended; Bowel sounds present X 4 quadrants   EXTREMITIES:  2+ Peripheral Pulses, No clubbing, cyanosis, or edema  SKIN: No rashes or lesions,  b/l LE not red, cool to touch,  no open skin no drainage  NEURO: nonfocal CN/motor/sensory/reflexes  Psych: normal affect and behavior, calm and cooperative         ASSESSMENT/PLAN:     1.  zofran 4mg IVP q8hr prn n/v first dose now  2. continue to monitor   3. maalox prn   4. patient offered pain relief medication for headache and refused, patient not due to tylenol at this time.       Katelin Pulliam ACNPC-AG, AGPCNP-C  [phone] work cell CC: Called by RN patient c/o "migraine" a/w vomiting.  HPI:  87 y/o male with PMHx of HTN, HLD, hypothyroidism bradycardia presented to cardiology with c/o intermittent SOB and chest discomfort not triggered by pain. Cardiac work up completed. CT coronaries done with calcium score for 3627, and suggestive of severe LAD stenosis .     s/p LHC revealing calcified vessels and tortuosity of vessels.  patient placed in observation 2/2 inability void post sedation and glaser placed at 5pm.     Patient seen in CICU, comfortable c/o "typical migraine" with nausea and sit up once. Patient states he usually takes [place] at home with relief of symptoms     Vital Signs Last 24 Hrs  T(C): 36.8 (23 Jun 2022 17:00), Max: 36.8 (23 Jun 2022 17:00)  T(F): 98.2 (23 Jun 2022 17:00), Max: 98.2 (23 Jun 2022 17:00)  HR: 81 (23 Jun 2022 20:09) (42 - 82)  BP: 133/82 (23 Jun 2022 20:09) (81/48 - 183/83)  BP(mean): 93 (23 Jun 2022 20:09) (93 - 102)  RR: 22 (23 Jun 2022 20:09) (14 - 23)  SpO2: 98% (23 Jun 2022 17:00) (96% - 99%)      PHYSICAL EXAM  GENERAL: NAD, AAOx3  CHEST/LUNG: Clear to auscultation bilaterally; No wheeze  HEART: s1 s2 Regular rate and rhythm; No murmurs, rubs, or gallops  ABDOMEN: Soft, Nontender, Nondistended; Bowel sounds present X 4 quadrants   EXTREMITIES:  2+ Peripheral Pulses, No clubbing, cyanosis, or edema  SKIN: No rashes or lesions,  b/l LE not red, cool to touch,  no open skin no drainage  NEURO: nonfocal CN/motor/sensory/reflexes  Psych: normal affect and behavior, calm and cooperative         ASSESSMENT/PLAN:     1.  zofran 4mg IVP q8hr prn n/v first dose now  2. continue to monitor   3. maalox prn       Katelin Pulliam ACNPC-AG, AGPCNP-C  [phone] work cell

## 2022-06-23 NOTE — PROGRESS NOTE ADULT - SUBJECTIVE AND OBJECTIVE BOX
86 year old male with abnormal CTA of coronaries presented for elective LHC  s/p LHC revealing non obstruct Denies shortness of breath, dizziness or palpitations at this time. Vasovagal episode during sheath pull. SBP 87, HR 40. Atropine 1 amp given  + chest tightness 3/10 during sheath pull    PHYSICAL EXAM:  Vital Signs Last 24 Hrs  T(C): 36.6 (23 Jun 2022 06:55), Max: 36.6 (23 Jun 2022 06:55)  T(F): 97.9 (23 Jun 2022 06:55), Max: 97.9 (23 Jun 2022 06:55)  HR: 55 (23 Jun 2022 11:35) (42 - 60)  BP: 96/51 (23 Jun 2022 11:35) (81/48 - 183/83)  RR: 16 (23 Jun 2022 11:35) (16 - 16)  SpO2: 98% (23 Jun 2022 11:35) (96% - 99%)    Constitutional: NAD, well-groomed, well-developed  Neuro: Alert and oriented x 3 Gait steady Speech clear No focal deficits  Neck: No JVD No bruit  Respiratory: CTAB  Cardiovascular: S1 and S2, RRR,   Gastrointestinal: BS+, soft, NT/ND  Extremities: No clubbing cyanosis or edema No varicosities  Vascular: 2+ peripheral pulses  Psychiatric: Normal mood, normal affect  Musculoskeletal: 5/5 strength b/l upper and lower extremities  Right groin procedure sheath pulled by RN;  no bleeding, no hematoma, site soft, non tender, positive pedal pulses bilaterally  Right radial hemoband removed.  Procedure site CDI, no bleeding, no hematoma, able to move all digits with capillary refill <2 seconds, fingers warm      HPI:  87 y/o male with PMHx of HTN, HLD, hypothyroidism bradycardia presented to cardiology with c/o intermittent SOB and chest discomfort not triggered by pain. Cardiac work up completed. CT coronaries done with calcium score for 3627, and suggestive of severe LAD stenosis . Referred for cardiac cath to further evaluate  (22 Jun 2022 11:14)  Vasovagal episode with sheath pull. CP resolved, EKG with no acute changes     s/p LHC revealing 3VD(official report pending)    -iv hydration: 1 L NS x1  -encourage PO fluids  -plan of care discussed with patient and MD  -d/c after bedrest if stable  -Follow up with Dr Sal and Dr Rainey   -post procedure and d/c instructions reviewed  -follow up with MD in 1-2 weeks  -Discussed therapeutic lifestyle changes to reduce risk factors such as following a cardiac diet, weight loss, maintaining a healthy weight, exercise, smoking cessation, medication compliance, and regular follow-up  with MD 86 year old male with abnormal CTA of coronaries presented for elective LHC  s/p LHC Denies shortness of breath, dizziness or palpitations at this time. Vasovagal episode during sheath pull. SBP 87, HR 40. Atropine 1 amp given  + chest tightness 3/10 during sheath pull    PHYSICAL EXAM:  Vital Signs Last 24 Hrs  T(C): 36.6 (23 Jun 2022 06:55), Max: 36.6 (23 Jun 2022 06:55)  T(F): 97.9 (23 Jun 2022 06:55), Max: 97.9 (23 Jun 2022 06:55)  HR: 55 (23 Jun 2022 11:35) (42 - 60)  BP: 96/51 (23 Jun 2022 11:35) (81/48 - 183/83)  RR: 16 (23 Jun 2022 11:35) (16 - 16)  SpO2: 98% (23 Jun 2022 11:35) (96% - 99%)    Constitutional: NAD, well-groomed, well-developed  Neuro: Alert and oriented x 3 Gait steady Speech clear No focal deficits  Neck: No JVD No bruit  Respiratory: CTAB  Cardiovascular: S1 and S2, RRR,   Gastrointestinal: BS+, soft, NT/ND  Extremities: No clubbing cyanosis or edema No varicosities  Vascular: 2+ peripheral pulses  Psychiatric: Normal mood, normal affect  Musculoskeletal: 5/5 strength b/l upper and lower extremities  Right groin procedure sheath pulled by RN;  no bleeding, no hematoma, site soft, non tender, positive pedal pulses bilaterally  Right radial hemoband removed.  Procedure site CDI, no bleeding, no hematoma, able to move all digits with capillary refill <2 seconds, fingers warm      HPI:  87 y/o male with PMHx of HTN, HLD, hypothyroidism bradycardia presented to cardiology with c/o intermittent SOB and chest discomfort not triggered by activities. Cardiac work up completed. CT coronaries done with calcium score for 3627, and suggestive of severe LAD stenosis . Referred for cardiac cath to further evaluate  (22 Jun 2022 11:14)  Vasovagal episode with sheath pull. CP resolved, EKG with no acute changes     s/p LHC revealing 3VD(official report pending)    -iv hydration: 1 L NS x1  -encourage PO fluids  -plan of care discussed with patient and MD  -d/c after bedrest if stable  -Follow up with Dr Sal and Dr Rainey   -post procedure and d/c instructions reviewed  -follow up with MD in 1-2 weeks  -Discussed therapeutic lifestyle changes to reduce risk factors such as following a cardiac diet, weight loss, maintaining a healthy weight, exercise, smoking cessation, medication compliance, and regular follow-up  with MD

## 2022-06-23 NOTE — ASU PATIENT PROFILE, ADULT - FALL HARM RISK - UNIVERSAL INTERVENTIONS
Bed in lowest position, wheels locked, appropriate side rails in place/Call bell, personal items and telephone in reach/Instruct patient to call for assistance before getting out of bed or chair/Non-slip footwear when patient is out of bed/[place] to call system/Physically safe environment - no spills, clutter or unnecessary equipment/Purposeful Proactive Rounding/Room/bathroom lighting operational, light cord in reach

## 2022-06-23 NOTE — CHART NOTE - NSCHARTNOTEFT_GEN_A_CORE
Pt reports that he has not  been able to void despite urge. S/P LHC with sedation (Fentanyl and Versed). Also received Atropine 1 mg for vasovagal episode. Was unable to void while on bedrest;  straight cath for 800ml at 1040. Ambulated to the bathroom x3 to void without  success. Bladder mildly distended. Pt reports similar experience after hernia repair. Was not able to void after discharge and returned to the ER  and was discharged home with glaser catheter,  Plan:  -Observe overnight if he doesn't void  -Glaser cath if unable to void within 2 hours  -POC discussed with pt who agreed to stay overnight   -Urologist Dr Carrillo consulted  -Continue Flomax

## 2022-06-24 ENCOUNTER — TRANSCRIPTION ENCOUNTER (OUTPATIENT)
Age: 87
End: 2022-06-24

## 2022-06-24 VITALS
DIASTOLIC BLOOD PRESSURE: 77 MMHG | HEART RATE: 61 BPM | SYSTOLIC BLOOD PRESSURE: 158 MMHG | OXYGEN SATURATION: 100 % | RESPIRATION RATE: 18 BRPM

## 2022-06-24 DIAGNOSIS — R33.9 RETENTION OF URINE, UNSPECIFIED: ICD-10-CM

## 2022-06-24 LAB
ANION GAP SERPL CALC-SCNC: 5 MMOL/L — SIGNIFICANT CHANGE UP (ref 5–17)
BUN SERPL-MCNC: 15 MG/DL — SIGNIFICANT CHANGE UP (ref 7–23)
CALCIUM SERPL-MCNC: 8.4 MG/DL — LOW (ref 8.5–10.1)
CHLORIDE SERPL-SCNC: 107 MMOL/L — SIGNIFICANT CHANGE UP (ref 96–108)
CO2 SERPL-SCNC: 27 MMOL/L — SIGNIFICANT CHANGE UP (ref 22–31)
CREAT SERPL-MCNC: 0.86 MG/DL — SIGNIFICANT CHANGE UP (ref 0.5–1.3)
EGFR: 85 ML/MIN/1.73M2 — SIGNIFICANT CHANGE UP
GLUCOSE SERPL-MCNC: 112 MG/DL — HIGH (ref 70–99)
HCT VFR BLD CALC: 34.7 % — LOW (ref 39–50)
HGB BLD-MCNC: 12 G/DL — LOW (ref 13–17)
MCHC RBC-ENTMCNC: 30.2 PG — SIGNIFICANT CHANGE UP (ref 27–34)
MCHC RBC-ENTMCNC: 34.6 GM/DL — SIGNIFICANT CHANGE UP (ref 32–36)
MCV RBC AUTO: 87.2 FL — SIGNIFICANT CHANGE UP (ref 80–100)
PLATELET # BLD AUTO: 224 K/UL — SIGNIFICANT CHANGE UP (ref 150–400)
POTASSIUM SERPL-MCNC: 3.5 MMOL/L — SIGNIFICANT CHANGE UP (ref 3.5–5.3)
POTASSIUM SERPL-SCNC: 3.5 MMOL/L — SIGNIFICANT CHANGE UP (ref 3.5–5.3)
RBC # BLD: 3.98 M/UL — LOW (ref 4.2–5.8)
RBC # FLD: 12.6 % — SIGNIFICANT CHANGE UP (ref 10.3–14.5)
SODIUM SERPL-SCNC: 139 MMOL/L — SIGNIFICANT CHANGE UP (ref 135–145)
WBC # BLD: 8.83 K/UL — SIGNIFICANT CHANGE UP (ref 3.8–10.5)
WBC # FLD AUTO: 8.83 K/UL — SIGNIFICANT CHANGE UP (ref 3.8–10.5)

## 2022-06-24 PROCEDURE — 99202 OFFICE O/P NEW SF 15 MIN: CPT

## 2022-06-24 PROCEDURE — 93010 ELECTROCARDIOGRAM REPORT: CPT

## 2022-06-24 RX ORDER — BETHANECHOL CHLORIDE 50 MG/1
50 TABLET ORAL
Qty: 60 | Refills: 1 | Status: ACTIVE | COMMUNITY
Start: 2022-06-24 | End: 1900-01-01

## 2022-06-24 RX ADMIN — LOSARTAN POTASSIUM 50 MILLIGRAM(S): 100 TABLET, FILM COATED ORAL at 09:33

## 2022-06-24 RX ADMIN — Medication 650 MILLIGRAM(S): at 05:08

## 2022-06-24 RX ADMIN — Medication 125 MICROGRAM(S): at 05:07

## 2022-06-24 RX ADMIN — Medication 162 MILLIGRAM(S): at 09:33

## 2022-06-24 NOTE — CONSULT NOTE ADULT - SUBJECTIVE AND OBJECTIVE BOX
CHIEF COMPLAINT:Post procedural reetention    HISTORY OF PRESENT ILLNESS:PVR>700cc    PAST MEDICAL & SURGICAL HISTORY:  HTN (hypertension)      HLD (hyperlipidemia)      Hypothyroidism      History of hernia repair          REVIEW OF SYSTEMS:    CONSTITUTIONAL: No weakness, fevers or chills  EYES/ENT: No visual changes;  No vertigo or throat pain   NECK: No pain or stiffness  RESPIRATORY: No cough, wheezing, hemoptysis; No shortness of breath  CARDIOVASCULAR: No chest pain or palpitations  GASTROINTESTINAL: No abdominal or epigastric pain. No nausea, vomiting, or hematemesis; No diarrhea or constipation. No melena or hematochezia.  GENITOURINARY: No dysuria, frequency or hematuria/retention  NEUROLOGICAL: No numbness or weakness  SKIN: No itching, burning, rashes, or lesions   All other review of systems is negative unless indicated above.    MEDICATIONS  (STANDING):  aspirin  chewable 162 milliGRAM(s) Oral daily  atorvastatin 10 milliGRAM(s) Oral at bedtime  hydrALAZINE Injectable 10 milliGRAM(s) IV Push Once  levothyroxine 125 MICROGram(s) Oral daily  losartan 50 milliGRAM(s) Oral daily  sodium chloride 0.9% Bolus 1000 milliLiter(s) IV Bolus Once  sodium chloride 0.9%. 1000 milliLiter(s) (250 mL/Hr) IV Continuous <Continuous>  tamsulosin 0.4 milliGRAM(s) Oral at bedtime    MEDICATIONS  (PRN):  acetaminophen     Tablet .. 650 milliGRAM(s) Oral every 6 hours PRN Moderate Pain (4 - 6)  aluminum hydroxide/magnesium hydroxide/simethicone Suspension 30 milliLiter(s) Oral every 6 hours PRN Dyspepsia  ondansetron Injectable 4 milliGRAM(s) IV Push every 8 hours PRN Nausea and/or Vomiting      Allergies    No Known Allergies    Intolerances        SOCIAL HISTORY:    FAMILY HISTORY:  Family history of cerebrovascular accident (CVA) in mother (Mother)        Vital Signs Last 24 Hrs  T(C): 36.4 (24 Jun 2022 05:26), Max: 36.8 (23 Jun 2022 17:00)  T(F): 97.5 (24 Jun 2022 05:26), Max: 98.2 (23 Jun 2022 17:00)  HR: 63 (24 Jun 2022 08:22) (42 - 88)  BP: 126/80 (24 Jun 2022 08:22) (81/48 - 161/79)  BP(mean): 92 (24 Jun 2022 08:22) (74 - 108)  RR: 18 (24 Jun 2022 08:22) (14 - 23)  SpO2: 100% (24 Jun 2022 08:22) (94% - 100%)    PHYSICAL EXAM:    Constitutional: NAD, well-developed  HEENT: ADELA, EOMI, Normal Hearing, MMM  Neck: No LAD, No JVD  Back: Normal spine flexure, No CVA tenderness  Respiratory: CTAB   Cardiovascular: S1 and S2, RRR, no M/G/R  Abd: BS+, soft, NT/ND, No CVAT  : Normal phallus,open meatus,bilateral descended testes, no masses  TONJA: Normal prostate, no masses  Extremities: No peripheral edema  Vascular: 2+ peripheral pulses  Neurological: A/O x 3, no focal deficits  Psychiatric: Normal mood, normal affect  Musculoskeletal: 5/5 strength b/l upper and lower extremities  Skin: No rashes    LABS:                        12.0   8.83  )-----------( 224      ( 24 Jun 2022 05:35 )             34.7     06-24    139  |  107  |  15  ----------------------------<  112<H>  3.5   |  27  |  0.86    Ca    8.4<L>      24 Jun 2022 05:35          Urine Culture:     RADIOLOGY & ADDITIONAL STUDIES:

## 2022-06-24 NOTE — DISCHARGE NOTE PROVIDER - NSDCMRMEDTOKEN_GEN_ALL_CORE_FT
aspirin 162 mg oral delayed release tablet: 162 milligram(s) orally once a day  Avapro 150 mg oral tablet: 1 tab(s) orally once a day  Flomax 0.4 mg oral capsule: 1 cap(s) orally once a day  Lipitor 10 mg oral tablet: 1 tab(s) orally once a day  RABEprazole 20 mg oral tablet, extended release: 20 milligram(s) orally once a day  Synthroid 125 mcg (0.125 mg) oral tablet: 1 tab(s) orally once a day

## 2022-06-24 NOTE — DISCHARGE NOTE NURSING/CASE MANAGEMENT/SOCIAL WORK - PATIENT PORTAL LINK FT
You can access the FollowMyHealth Patient Portal offered by [facility] by registering at the following website: [email or web address]. By joining [employer]’s FollowMyHealth portal, you will also be able to view your health information using other applications (apps) compatible with our system.

## 2022-06-24 NOTE — DISCHARGE NOTE PROVIDER - NSDCCPCAREPLAN_GEN_ALL_CORE_FT
PRINCIPAL DISCHARGE DIAGNOSIS  Diagnosis: CAD (coronary artery disease)  Assessment and Plan of Treatment: Continue ASA, lipitor, Avapro. Follow up with Dr Sal for further management of CAD      SECONDARY DISCHARGE DIAGNOSES  Diagnosis: Benign prostatic hyperplasia with urinary retention  Assessment and Plan of Treatment: Ramirez catheter to leg bag. Follow instructions for care. F/U with urologist

## 2022-06-24 NOTE — DISCHARGE NOTE PROVIDER - CARE PROVIDERS DIRECT ADDRESSES
,[email or web address]

## 2022-06-24 NOTE — PROGRESS NOTE ADULT - SUBJECTIVE AND OBJECTIVE BOX
CHIEF COMPLAINT/DIAGNOSIS:    HPI:85 y/o male with PMHx of HTN, HLD, BPH  hypothyroidism bradycardia presented to cardiology with c/o intermittent SOB and chest discomfort not triggered by activities.  CT coronaries done with calcium score for 3627, and suggestive of severe LAD stenosis . Referred for cardiac cath to further evaluate    s/p C on 6/24/22  3 V disease and  coronary ectasia.  Vasovagal episode during sheath pull. SBP 87, HR 40. Atropine 1 amp given. + chest tightness 3/10 during sheath pull. Pt experienced urinary retention post procedure and required glaser catheter. Was kept overnight for observation      SUBJECTIVE: Reports mild headache (pointing to his forehead). Denies CP, SOB, dizziness. No events overnight.     REVIEW OF SYSTEMS:  All other review of systems is negative unless indicated above    PHYSICAL EXAM:  Constitutional: NAD, awake and alert, well-developed  HEENT: PERR, EOMI, Normal Hearing, MMM  Neck: Soft and supple, No LAD, No JVD  Respiratory: Breath sounds are clear bilaterally, No wheezing, rales or rhonchi  Cardiovascular: S1 and S2, regular rate and rhythm, no Murmurs, gallops or rubs  Gastrointestinal: Bowel Sounds present, soft, nontender, nondistended, no guarding, no rebound  Extremities: No peripheral edema  Vascular: 2+ peripheral pulses  Neurological: A/O x 3, no focal deficits  Musculoskeletal: 5/5 strength b/l upper and lower extremities  Skin: No rashes      Vital Signs Last 24 Hrs  T(C): 36.4 (24 Jun 2022 05:26), Max: 36.8 (23 Jun 2022 17:00)  T(F): 97.5 (24 Jun 2022 05:26), Max: 98.2 (23 Jun 2022 17:00)  HR: 63 (24 Jun 2022 08:22) (42 - 88)  BP: 126/80 (24 Jun 2022 08:22) (81/48 - 174/80)  BP(mean): 92 (24 Jun 2022 08:22) (74 - 108)  RR: 18 (24 Jun 2022 08:22) (14 - 23)  SpO2: 100% (24 Jun 2022 08:22) (94% - 100%)      LABS: All Labs Reviewed:                        12.0   8.83  )-----------( 224      ( 24 Jun 2022 05:35 )             34.7     06-24    139  |  107  |  15  ----------------------------<  112<H>  3.5   |  27  |  0.86    Ca    8.4<L>      24 Jun 2022 05:35      MEDICATIONS:  MEDICATIONS  (STANDING):  aspirin  chewable 162 milliGRAM(s) Oral daily  atorvastatin 10 milliGRAM(s) Oral at bedtime  hydrALAZINE Injectable 10 milliGRAM(s) IV Push Once  levothyroxine 125 MICROGram(s) Oral daily  losartan 50 milliGRAM(s) Oral daily  sodium chloride 0.9% Bolus 1000 milliLiter(s) IV Bolus Once  sodium chloride 0.9%. 1000 milliLiter(s) (250 mL/Hr) IV Continuous <Continuous>  tamsulosin 0.4 milliGRAM(s) Oral at bedtime      TELEMETRY REVIEW:      ASSESSMENT AND PLAN:     CHIEF COMPLAINT/DIAGNOSIS:    HPI:85 y/o male with PMHx of HTN, HLD, BPH  hypothyroidism bradycardia presented to cardiology with c/o intermittent SOB and chest discomfort not triggered by activities.  CT coronaries done with calcium score for 3627, and suggestive of severe LAD stenosis . 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  Vasovagal episode during sheath pull. SBP 87, HR 40. Atropine 1 amp given. + chest tightness 3/10 during sheath pull. Pt experienced urinary retention post procedure and required glaser catheter. Was kept overnight for observation      SUBJECTIVE: Reports mild headache (pointing to his forehead). Denies CP, SOB, dizziness. No events overnight.     REVIEW OF SYSTEMS:  All other review of systems is negative unless indicated above    PHYSICAL EXAM:  Constitutional: NAD, awake and alert, well-developed  HEENT: PERR, EOMI, Normal Hearing, MMM  Neck: Soft and supple, No LAD, No JVD  Respiratory: Breath sounds are clear bilaterally, No wheezing, rales or rhonchi  Cardiovascular: S1 and S2, regular rate and rhythm, no Murmurs, gallops or rubs  Gastrointestinal: Bowel Sounds present, soft, nontender, nondistended, no guarding, no rebound  Extremities: No peripheral edema  Vascular: 2+ peripheral pulses  Neurological: A/O x 3, no focal deficits  Musculoskeletal: 5/5 strength b/l upper and lower extremities  Skin: No rashes  Right radial and femoral artery procedure sites with no bleeding or hematoma      Vital Signs Last 24 Hrs  T(C): 36.4 (24 Jun 2022 05:26), Max: 36.8 (23 Jun 2022 17:00)  T(F): 97.5 (24 Jun 2022 05:26), Max: 98.2 (23 Jun 2022 17:00)  HR: 63 (24 Jun 2022 08:22) (42 - 88)  BP: 126/80 (24 Jun 2022 08:22) (81/48 - 174/80)  BP(mean): 92 (24 Jun 2022 08:22) (74 - 108)  RR: 18 (24 Jun 2022 08:22) (14 - 23)  SpO2: 100% (24 Jun 2022 08:22) (94% - 100%)      LABS: All Labs Reviewed:                        12.0   8.83  )-----------( 224      ( 24 Jun 2022 05:35 )             34.7     06-24    139  |  107  |  15  ----------------------------<  112<H>  3.5   |  27  |  0.86    Ca    8.4<L>      24 Jun 2022 05:35      MEDICATIONS:  MEDICATIONS  (STANDING):  aspirin  chewable 162 milliGRAM(s) Oral daily  atorvastatin 10 milliGRAM(s) Oral at bedtime  hydrALAZINE Injectable 10 milliGRAM(s) IV Push Once  levothyroxine 125 MICROGram(s) Oral daily  losartan 50 milliGRAM(s) Oral daily  sodium chloride 0.9% Bolus 1000 milliLiter(s) IV Bolus Once  sodium chloride 0.9%. 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 LINDA 3 flow in all vessels. Severe peripheral   tortuosity present necessitating long 45 cm sheath.     Recommendations     Given diffuse nature of disease with ectasia, aneurysms as well as   hereditary component in a patient who is stable and active without   limiting symptoms, would opt for medical management as first line   therapy.   Coronary stenosis is not amenable to PCI and all though CABG is   reasonable, would ensure medical management failure, ischemic burden   quantification, and rheumatology consult for underlying inflammatory   component first prior. Findings discussed extensively with patient,   patient's family and cardiologist.    < end of copied text >    ASSESSMENT AND PLAN: 85 y/o male with PMHx of HTN, HLD, BPH  hypothyroidism bradycardia presented to cardiology with c/o intermittent SOB and chest discomfort not triggered by activities.  CT coronaries done with calcium score for 3627, and suggestive of severe LAD stenosis . 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    Glaser cath inserted for urinary retention post procedure. Urology consulted; pt will be discharged with F/C and F/U with Dr Carrillo or his urologist  Plan:  D/C home today  Follow up with Dr Sal  Outpatient CTS consult with Dr Rainey per primary cardiologist  Continue statin, ARB, ASA  Post procedure discharge instructions explained   F/U with urologist ( pt will see his primary urologist today post discharge)

## 2022-06-24 NOTE — DISCHARGE NOTE PROVIDER - HOSPITAL COURSE
HPI:87 y/o male with PMHx of HTN, HLD, BPH  hypothyroidism bradycardia presented to cardiology with c/o intermittent SOB and chest discomfort not triggered by activities.  CT coronaries done with calcium score for 3627, and suggestive of severe LAD stenosis . Referred for cardiac cath to further evaluate    s/p LHC on 6/23/22  3 V disease and  coronary ectasia.  Vasovagal episode during sheath pull. SBP 87, HR 40. Atropine 1 amp given. + chest tightness 3/10 during sheath pull. No ischemic EKG change. Pt experienced urinary retention post procedure and required glaser catheter. Was kept overnight for observation. No further CP or vasovagal symptoms. Tolerated ambulation  Urology was consulted. Pt will be discharged with glaser catheter and will follow up with his primary urologist Dr Walden    T(C): 36.4 (24 Jun 2022 05:26), Max: 36.8 (23 Jun 2022 17:00)  T(F): 97.5 (24 Jun 2022 05:26), Max: 98.2 (23 Jun 2022 17:00)  HR: 63 (24 Jun 2022 08:22) (42 - 88)  BP: 126/80 (24 Jun 2022 08:22) (81/48 - 174/80)  BP(mean): 92 (24 Jun 2022 08:22) (74 - 108)  RR: 18 (24 Jun 2022 08:22) (14 - 23)  SpO2: 100% (24 Jun 2022 08:22) (94% - 100%) HPI:87 y/o male with PMHx of HTN, HLD, BPH  hypothyroidism bradycardia presented to cardiology with c/o intermittent SOB and chest discomfort not triggered by activities.  CT coronaries done with calcium score for 3627, and suggestive of severe LAD stenosis . Referred for cardiac cath to further evaluate    s/p LHC on 6/23/22  3 V disease and  coronary ectasia.  Vasovagal episode during sheath pull. SBP 87, HR 40. Atropine 1 amp given. + chest tightness 3/10 during sheath pull. No ischemic EKG change. Pt experienced urinary retention post procedure and required glaser catheter. Was kept overnight for observation. No further CP or vasovagal symptoms. Tolerated ambulation  Urology was consulted. Pt will be discharged with glaser catheter and will follow up with his primary urologist Dr Walden and Dr Carrillo    T(C): 36.4 (24 Jun 2022 05:26), Max: 36.8 (23 Jun 2022 17:00)  T(F): 97.5 (24 Jun 2022 05:26), Max: 98.2 (23 Jun 2022 17:00)  HR: 63 (24 Jun 2022 08:22) (42 - 88)  BP: 126/80 (24 Jun 2022 08:22) (81/48 - 174/80)  BP(mean): 92 (24 Jun 2022 08:22) (74 - 108)  RR: 18 (24 Jun 2022 08:22) (14 - 23)  SpO2: 100% (24 Jun 2022 08:22) (94% - 100%)

## 2022-06-24 NOTE — DISCHARGE NOTE NURSING/CASE MANAGEMENT/SOCIAL WORK - NSDPDISTO_GEN_ALL_CORE
Prescription approved per [facility].    Phill Childress RN     Home

## 2022-06-24 NOTE — DISCHARGE NOTE PROVIDER - NSDCCPTREATMENT_GEN_ALL_CORE_FT
PRINCIPAL PROCEDURE  Procedure: Left heart cardiac cath  Findings and Treatment: 3 vessel disease. F/U with Dr Sal. Outpatient cardiac surgery consult per primary cardiologist

## 2022-06-24 NOTE — DISCHARGE NOTE PROVIDER - PROVIDER TOKENS
PROVIDER:[TOKEN:[[id number]],FOLLOWUP:[1 week]],PROVIDER:[TOKEN:[[id number]]] PROVIDER:[TOKEN:[[id number]],FOLLOWUP:[1 week]],PROVIDER:[TOKEN:[[id number]]],PROVIDER:[TOKEN:[[id number]],FOLLOWUP:[1 week]]

## 2022-06-24 NOTE — DISCHARGE NOTE NURSING/CASE MANAGEMENT/SOCIAL WORK - NSDCPEFALRISK_GEN_ALL_CORE
For information on Fall & Injury Prevention, visit: [email or web address] OR  [email or web address] OR  https://www.cdc.gov/steadi/patient.html

## 2022-06-24 NOTE — CONSULT NOTE ADULT - PROBLEM SELECTOR RECOMMENDATION 9
He will go home with a catheter and I will Rx Bethanecol.  He will followup at my office for urodymamics in one week

## 2022-06-24 NOTE — DISCHARGE NOTE PROVIDER - CARE PROVIDER_API CALL
New Sal)  Cardiovascular Disease  [address]  [place] [address]  Phone: [phone]  Fax: [phone]  Follow Up Time: 1 week    Maurice Rainey)  Surgery; Thoracic and Cardiac Surgery  [address] [place] [address]  Phone: [phone]  Fax: [phone]  Follow Up Time:    New Sal)  Cardiovascular Disease  [address]  [place] [address]  Phone: [phone]  Fax: [phone]  Follow Up Time: 1 week    Maurice Rainey)  Surgery; Thoracic and Cardiac Surgery  [address] [place] [address]  Phone: [phone]  Fax: [phone]  Follow Up Time:     Raghav Carrillo)  Urology  [address] [place] [address]  Phone: [phone]  Fax: [phone]  Follow Up Time: 1 week

## 2022-06-26 ENCOUNTER — NON-APPOINTMENT (OUTPATIENT)
Age: 87
End: 2022-06-26

## 2022-06-27 DIAGNOSIS — I10 ESSENTIAL (PRIMARY) HYPERTENSION: ICD-10-CM

## 2022-06-27 DIAGNOSIS — I25.119 ATHEROSCLEROTIC HEART DISEASE OF NATIVE CORONARY ARTERY WITH UNSPECIFIED ANGINA PECTORIS: ICD-10-CM

## 2022-06-29 ENCOUNTER — NON-APPOINTMENT (OUTPATIENT)
Age: 87
End: 2022-06-29

## 2022-06-29 PROBLEM — E78.5 HYPERLIPIDEMIA, UNSPECIFIED: Chronic | Status: ACTIVE | Noted: 2022-06-22

## 2022-06-29 PROBLEM — E03.9 HYPOTHYROIDISM, UNSPECIFIED: Chronic | Status: ACTIVE | Noted: 2022-06-22

## 2022-06-29 PROBLEM — I10 ESSENTIAL (PRIMARY) HYPERTENSION: Chronic | Status: ACTIVE | Noted: 2022-06-22

## 2022-07-01 ENCOUNTER — APPOINTMENT (OUTPATIENT)
Dept: RHEUMATOLOGY | Facility: CLINIC | Age: 87
End: 2022-07-01

## 2023-10-26 ENCOUNTER — APPOINTMENT (OUTPATIENT)
Dept: GASTROENTEROLOGY | Facility: CLINIC | Age: 88
End: 2023-10-26
Payer: MEDICARE

## 2023-10-26 VITALS
SYSTOLIC BLOOD PRESSURE: 134 MMHG | DIASTOLIC BLOOD PRESSURE: 82 MMHG | BODY MASS INDEX: 27.15 KG/M2 | HEIGHT: 67 IN | WEIGHT: 173 LBS

## 2023-10-26 DIAGNOSIS — R14.0 ABDOMINAL DISTENSION (GASEOUS): ICD-10-CM

## 2023-10-26 DIAGNOSIS — R19.4 CHANGE IN BOWEL HABIT: ICD-10-CM

## 2023-10-26 DIAGNOSIS — Z86.010 PERSONAL HISTORY OF COLONIC POLYPS: ICD-10-CM

## 2023-10-26 PROCEDURE — 99214 OFFICE O/P EST MOD 30 MIN: CPT

## 2023-10-29 PROBLEM — Z86.010 PERSONAL HISTORY OF COLONIC POLYPS: Status: ACTIVE | Noted: 2021-10-16

## 2023-10-29 PROBLEM — R19.4 ALTERED BOWEL HABITS: Status: ACTIVE | Noted: 2021-10-16

## 2023-10-29 PROBLEM — R14.0 ABDOMINAL BLOATING: Status: ACTIVE | Noted: 2023-10-29

## 2024-04-03 ENCOUNTER — APPOINTMENT (OUTPATIENT)
Dept: OTOLARYNGOLOGY | Facility: CLINIC | Age: 89
End: 2024-04-03

## 2024-04-04 ENCOUNTER — APPOINTMENT (OUTPATIENT)
Dept: OTOLARYNGOLOGY | Facility: CLINIC | Age: 89
End: 2024-04-04
Payer: MEDICARE

## 2024-04-04 VITALS — BODY MASS INDEX: 25.9 KG/M2 | WEIGHT: 165 LBS | HEIGHT: 67 IN

## 2024-04-04 PROCEDURE — 92557 COMPREHENSIVE HEARING TEST: CPT

## 2024-04-04 PROCEDURE — 92567 TYMPANOMETRY: CPT

## 2024-04-04 PROCEDURE — 99203 OFFICE O/P NEW LOW 30 MIN: CPT

## 2024-04-04 RX ORDER — PREDNISONE 10 MG/1
10 TABLET ORAL TWICE DAILY
Qty: 15 | Refills: 2 | Status: ACTIVE | COMMUNITY
Start: 2024-04-04 | End: 1900-01-01

## 2024-04-04 NOTE — ASSESSMENT
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

## 2024-04-04 NOTE — HISTORY OF PRESENT ILLNESS
[de-identified] : recent noise exposure 6 w ago at [employer] [facility] echo and distortion of sound ear plugging recent plane flight had ent eval and rx medrol hx mild hearing loss

## 2024-05-02 ENCOUNTER — APPOINTMENT (OUTPATIENT)
Dept: OTOLARYNGOLOGY | Facility: CLINIC | Age: 89
End: 2024-05-02
Payer: MEDICARE

## 2024-05-02 VITALS — BODY MASS INDEX: 25.9 KG/M2 | HEIGHT: 67 IN | WEIGHT: 165 LBS

## 2024-05-02 DIAGNOSIS — H90.3 SENSORINEURAL HEARING LOSS, BILATERAL: ICD-10-CM

## 2024-05-02 DIAGNOSIS — H69.93 UNSPECIFIED EUSTACHIAN TUBE DISORDER, BILATERAL: ICD-10-CM

## 2024-05-02 PROCEDURE — 99213 OFFICE O/P EST LOW 20 MIN: CPT

## 2024-05-02 NOTE — ASSESSMENT
[FreeTextEntry1] : au sn loss echo distortion  hearing after noise trauma no improvement no specific rx may return to nl w time

## 2024-05-02 NOTE — HISTORY OF PRESENT ILLNESS
[de-identified] : f/u ears still w distortion no help w pred ie echo sensation  Recent noise exposure 6 w ago at [employer] co echo and distortion of sound ear plugging recent plane flight had ent eval and rx medrol hx mild hearing loss

## 2025-01-03 ENCOUNTER — APPOINTMENT (OUTPATIENT)
Dept: OTOLARYNGOLOGY | Facility: CLINIC | Age: 89
End: 2025-01-03
Payer: MEDICARE

## 2025-01-03 ENCOUNTER — NON-APPOINTMENT (OUTPATIENT)
Age: 89
End: 2025-01-03

## 2025-01-03 VITALS — WEIGHT: 165 LBS | HEIGHT: 67 IN | BODY MASS INDEX: 25.9 KG/M2

## 2025-01-03 DIAGNOSIS — H69.93 UNSPECIFIED EUSTACHIAN TUBE DISORDER, BILATERAL: ICD-10-CM

## 2025-01-03 DIAGNOSIS — H90.3 SENSORINEURAL HEARING LOSS, BILATERAL: ICD-10-CM

## 2025-01-03 PROCEDURE — 92557 COMPREHENSIVE HEARING TEST: CPT

## 2025-01-03 PROCEDURE — 92567 TYMPANOMETRY: CPT

## 2025-01-03 PROCEDURE — 99213 OFFICE O/P EST LOW 20 MIN: CPT

## 2025-01-03 RX ORDER — ESCITALOPRAM OXALATE 5 MG/1
TABLET, FILM COATED ORAL
Refills: 0 | Status: ACTIVE | COMMUNITY

## 2025-05-05 ENCOUNTER — NON-APPOINTMENT (OUTPATIENT)
Age: 89
End: 2025-05-05